# Patient Record
Sex: MALE | Race: AMERICAN INDIAN OR ALASKA NATIVE | URBAN - METROPOLITAN AREA
[De-identification: names, ages, dates, MRNs, and addresses within clinical notes are randomized per-mention and may not be internally consistent; named-entity substitution may affect disease eponyms.]

---

## 2024-03-07 ENCOUNTER — OFFICE VISIT (OUTPATIENT)
Dept: FAMILY MEDICINE CLINIC | Facility: CLINIC | Age: 34
End: 2024-03-07
Payer: COMMERCIAL

## 2024-03-07 VITALS
BODY MASS INDEX: 23.49 KG/M2 | WEIGHT: 155 LBS | DIASTOLIC BLOOD PRESSURE: 87 MMHG | HEIGHT: 68 IN | TEMPERATURE: 98 F | RESPIRATION RATE: 16 BRPM | SYSTOLIC BLOOD PRESSURE: 121 MMHG | OXYGEN SATURATION: 98 % | HEART RATE: 79 BPM

## 2024-03-07 DIAGNOSIS — B34.9 VIRAL ILLNESS: Primary | ICD-10-CM

## 2024-03-07 PROCEDURE — 3074F SYST BP LT 130 MM HG: CPT | Performed by: NURSE PRACTITIONER

## 2024-03-07 PROCEDURE — 99203 OFFICE O/P NEW LOW 30 MIN: CPT | Performed by: NURSE PRACTITIONER

## 2024-03-07 PROCEDURE — 3079F DIAST BP 80-89 MM HG: CPT | Performed by: NURSE PRACTITIONER

## 2024-03-07 PROCEDURE — 3008F BODY MASS INDEX DOCD: CPT | Performed by: NURSE PRACTITIONER

## 2024-03-07 NOTE — PROGRESS NOTES
CHIEF COMPLAINT:     Chief Complaint   Patient presents with    Covid     5 days, HA, redness in throat, congestion, cough, fatigue, denies fever, exposure to covid, otc loratadine, flonase, throat spray, tylenol        HPI:   Franci Ohara is a 33 year old male who presents with symptoms as described below for 5 days.       Fever:     Yes []     No [x]       Cough:    Yes [x]     No []       Dry []     Productive [x]   Congestion: Yes [x]     No []      Rhinorrhea: Yes []     No [x]      Loss of Smell/Taste: Yes []     No [x]        Fatigue:   Yes [x]     No []     Myalgias: Yes []     No [x]   Chills:       Yes []    No [x]       Headache:     Yes [x]     No []       Sore throat:   Yes []      No [x]      Ear Pain:  Yes []      No [x]        Shortness of breath:  Yes []   No [x]     Wheezing:   Yes []   No [x]     Chest pain/pressure:     Yes []     No [x]        GI symptoms: Yes []     No [x]                     Nausea  []; Vomiting  []; Diarrhea  [] ; Upset stomach [];   Abdominal Pain  []          Pt reports additional symptoms red spot in throat, unsure if red spot is a new finding.       Symptoms have been the same since onset.  Treating symptoms with flonase, claritin, tylenol. .      PT  reports covid exposure , Pt  denies exposure to strep or mono.   Previous history of covid: reports   History of previous covid testing: reports   Any recent travel: denies     Other factors/medical conditions that may impact condition: none      No current outpatient medications on file.      History reviewed. No pertinent past medical history.   History reviewed. No pertinent surgical history.      Social History     Socioeconomic History    Marital status:    Tobacco Use    Smoking status: Never    Smokeless tobacco: Never         REVIEW OF SYSTEMS:   GENERAL: decreased appetite, drinking fluids yes.  SKIN: Denies rash or other lesions  HEENT: See HPI  LUNGS: See HPI  CARDIOVASCULAR: denies palpitations; see  HPI  GI: see HPI  NEURO: Denies dizziness; see HPI    EXAM:   /87   Pulse 79   Temp 97.5 °F (36.4 °C)   Resp 16   Ht 5' 8\" (1.727 m)   Wt 155 lb (70.3 kg)   SpO2 98%   BMI 23.57 kg/m²   GENERAL: appears well, well nourished, in no apparent distress  SKIN: no rashes,no suspicious lesions  HEAD: atraumatic, normocephalic.  no tenderness on palpation of  sinuses  EYES: conjunctiva clear  EARS: TM's cloudy, no bulging, no retraction,scant fluid, bony landmarks obscured  NOSE: Nostrils patent, clear nasal discharge, nasal mucosa erythematous.   THROAT: Oral mucosa pink, moist. Posterior pharynx is  erythematous on left pillar only, no exudates. Uvula midline   NECK: Supple, non-tender  LUNGS: clear to auscultation bilaterally, no wheezes or rhonchi. Breathing is non labored. Speaking in full sentences without hesitation  CARDIO: RRR without murmur  EXTREMITIES: no cyanosis, clubbing or edema  LYMPH:  no lymphadenopathy.    NEURO: No focal deficits       ASSESSMENT AND PLAN:   Franci Ohara is a 33 year old male who presents with upper respiratory symptoms that are consistent with likely Covid-19, declined send out testing, advised home testing if interested. Pt is day 5 of symptoms and day 8 of exposure.     ASSESSMENT:   Encounter Diagnosis   Name Primary?    Viral illness Yes       Meds & Refills for this Visit:  Requested Prescriptions      No prescriptions requested or ordered in this encounter       PLAN:    COVID-19 testing declined.   Discussed length of time to obtain results as well as obtaining results on mychart.   Encouraged mychart sign up if not already completed.   CDC quarantine recommendations reviewed. Advised a negative COVID test does not guarantee pt is not infectious or contagious.   Advised to follow up with PCP, notify them of pending test first, for reevaluation for persistent symptoms.    Discussed s/s of worsening infection/condition with Patient and importance of prompt medical  re-evaluation including when to seek emergency care. Patient voiced understanding  Comfort care as described in Patient Instructions. Increase fluids and rest.   May consider OTC tylenol or ibuprofen as needed and directed on packaging .  May consider OTC dextromethorphan as needed and directed on packaging as a cough suppressant   May consider OTC saline nasal spray per box instructions  All questions were addressed and answered.   Patient voiced understanding.   There are no Patient Instructions on file for this visit.  The patient/parent indicates understanding of these issues and agrees to the plan.

## 2024-03-13 ENCOUNTER — WALK IN (OUTPATIENT)
Dept: URGENT CARE | Age: 34
End: 2024-03-13

## 2024-03-13 VITALS
TEMPERATURE: 98.2 F | HEART RATE: 79 BPM | SYSTOLIC BLOOD PRESSURE: 126 MMHG | OXYGEN SATURATION: 98 % | RESPIRATION RATE: 18 BRPM | DIASTOLIC BLOOD PRESSURE: 80 MMHG

## 2024-03-13 DIAGNOSIS — J02.9 ST (SORE THROAT): Primary | ICD-10-CM

## 2024-03-13 DIAGNOSIS — R05.1 ACUTE COUGH: ICD-10-CM

## 2024-03-13 DIAGNOSIS — J06.9 UPPER RESPIRATORY TRACT INFECTION, UNSPECIFIED TYPE: ICD-10-CM

## 2024-03-13 DIAGNOSIS — J02.9 VIRAL PHARYNGITIS: ICD-10-CM

## 2024-03-13 LAB
INTERNAL PROCEDURAL CONTROLS ACCEPTABLE: YES
S PYO AG THROAT QL IA.RAPID: NEGATIVE
TEST LOT EXPIRATION DATE: NORMAL
TEST LOT NUMBER: NORMAL

## 2024-03-13 RX ORDER — PREDNISONE 20 MG/1
40 TABLET ORAL DAILY
Qty: 6 TABLET | Refills: 0 | Status: SHIPPED | OUTPATIENT
Start: 2024-03-13 | End: 2024-03-16

## 2024-07-08 ENCOUNTER — LAB REQUISITION (OUTPATIENT)
Dept: LAB | Facility: HOSPITAL | Age: 34
End: 2024-07-08
Payer: COMMERCIAL

## 2024-07-08 DIAGNOSIS — Z00.00 ENCOUNTER FOR GENERAL ADULT MEDICAL EXAMINATION WITHOUT ABNORMAL FINDINGS: ICD-10-CM

## 2024-07-08 PROCEDURE — 88305 TISSUE EXAM BY PATHOLOGIST: CPT | Performed by: DENTIST

## 2024-09-09 ENCOUNTER — OFFICE VISIT (OUTPATIENT)
Dept: FAMILY MEDICINE CLINIC | Facility: CLINIC | Age: 34
End: 2024-09-09
Payer: COMMERCIAL

## 2024-09-09 VITALS
HEART RATE: 82 BPM | SYSTOLIC BLOOD PRESSURE: 120 MMHG | OXYGEN SATURATION: 98 % | DIASTOLIC BLOOD PRESSURE: 72 MMHG | BODY MASS INDEX: 24.86 KG/M2 | WEIGHT: 164 LBS | RESPIRATION RATE: 16 BRPM | HEIGHT: 68 IN

## 2024-09-09 DIAGNOSIS — Z13.29 SCREENING FOR THYROID DISORDER: ICD-10-CM

## 2024-09-09 DIAGNOSIS — Z00.00 WELLNESS EXAMINATION: Primary | ICD-10-CM

## 2024-09-09 DIAGNOSIS — Z13.220 LIPID SCREENING: ICD-10-CM

## 2024-09-09 DIAGNOSIS — Z80.41 FAMILY HISTORY OF OVARIAN CANCER: ICD-10-CM

## 2024-09-09 DIAGNOSIS — Z12.5 SCREENING FOR PROSTATE CANCER: ICD-10-CM

## 2024-09-09 DIAGNOSIS — Z13.0 SCREENING FOR DEFICIENCY ANEMIA: ICD-10-CM

## 2024-09-09 RX ORDER — MINOXIDIL 2.5 MG/1
TABLET ORAL
COMMUNITY
Start: 2024-08-12

## 2024-09-09 RX ORDER — FINASTERIDE 1 MG/1
TABLET, FILM COATED ORAL
COMMUNITY
Start: 2022-09-09

## 2024-09-09 NOTE — PROGRESS NOTES
HPI:   Franci Oahra is a 34 year old male who presents for an Annual Health Visit.     Hre to establish care, hasn't had a pcp since moving to [place].    Mom was recently diagnosed with cancer, and wants to make sure preventive care is in place.    Not fully complete.    Social:  Moved Last April,   with one kid, and second one on th way  2 year old., lives nearby.  .   Was living in [place].  Has family in the area, wife's aunt lives nearby.          Allergies:   No Known Allergies    CURRENT MEDICATIONS   Current Outpatient Medications   Medication Sig Dispense Refill    finasteride 1 MG Oral Tab       minoxidil 2.5 MG Oral Tab         HISTORICAL INFORMATION   Past Medical History:    Hyperlipidemia    Borderline    Hypothyroidism    No longer have hashimotos      History reviewed. No pertinent surgical history.   Family History   Problem Relation Age of Onset    Cancer Mother         Diagnosed with colorectal cancer last month    Hypertension Father       SOCIAL HISTORY   Social History     Socioeconomic History    Marital status:    Tobacco Use    Smoking status: Never    Smokeless tobacco: Never   Substance and Sexual Activity    Alcohol use: Yes     Alcohol/week: 3.0 standard drinks of alcohol     Types: 3 Cans of beer per week    Drug use: Yes     Frequency: 1.0 times per week     Types: Cannabis     Comment: Less than once per week   Other Topics Concern    Caffeine Concern No    Exercise Yes    Seat Belt Yes    Special Diet No    Stress Concern Yes    Weight Concern No     Social History     Social History Narrative    Not on file        REVIEW OF SYSTEMS:     Constitutional: negative  Eyes: negative  ENT: negative  Respiratory: negative  Cardiovascular: negative  Gastrointestinal: negative  Integument/Breast: negative  Genitourinary: negative  Heme/Lymph: negative  Musculoskeletal: negative  Neurological: negative  Psych: negative  Endocrine: negative  Allergic/Immune:  negative    EXAM:   /72   Pulse 82   Resp 16   Ht 5' 8\" (1.727 m)   Wt 164 lb (74.4 kg)   SpO2 98%   BMI 24.94 kg/m²    Wt Readings from Last 6 Encounters:   09/09/24 164 lb (74.4 kg)   03/07/24 155 lb (70.3 kg)     Body mass index is 24.94 kg/m².    General: alert, appears stated age, and cooperative  Head: Normocephalic, without obvious abnormality, atraumatic  Eyes: conjunctivae/corneas clear. PERRL, EOM's intact. Fundi benign.  Ears: normal TM's and external ear canals both ears  Nose: Nares normal. Septum midline. Mucosa normal. No drainage or sinus tenderness.  Throat: lips, mucosa, and tongue normal; teeth and gums normal  Neck: no adenopathy, no carotid bruit, no JVD, supple, symmetrical, trachea midline, and thyroid not enlarged, symmetric, no tenderness/mass/nodules  Heart: S1, S2 normal, no murmur, click, rub or gallop, regular rate and rhythm  Lungs: clear to auscultation bilaterally  Chest wall: no tenderness  Abdomen: soft, non-tender; bowel sounds normal; no masses,  no organomegaly  : deferred  Back: symmetric, no curvature. ROM normal. No CVA tenderness.  Extremities: extremities normal, atraumatic, no cyanosis or edema  Pulses: 2+ and symmetric  Skin: Skin color, texture, turgor normal. No rashes or lesions  Lymph Nodes: Cervical, supraclavicular, and axillary nodes normal.  Neurologic: Grossly normal    ASSESSMENT AND PLAN:   Franci was seen today for physical.    Diagnoses and all orders for this visit:    Wellness examination  -     Comp Metabolic Panel (14); Future  -     CBC With Differential With Platelet; Future  -     Lipid Panel; Future    Lipid screening  -     Lipid Panel; Future    Screening for deficiency anemia  -     CBC With Differential With Platelet; Future    Screening for prostate cancer  -     PSA, Total (Screening) [E]; Future    Family history of ovarian cancer  -     Genetic Referral - In Network    Screening for thyroid disorder  -     TSH W Reflex To Free T4  [E]; Future    Overall doing well, has concerns for preventive health particularly in light of his mother's ovarian cancer diagnosis  There are no Patient Instructions on file for this visit.    The patient indicates understanding of these issues and agrees to the plan.    Problem List:  There is no problem list on file for this patient.      Kam Rivas MD  9/9/2024  4:29 PM

## 2024-10-09 ENCOUNTER — LAB ENCOUNTER (OUTPATIENT)
Dept: LAB | Age: 34
End: 2024-10-09
Attending: FAMILY MEDICINE
Payer: COMMERCIAL

## 2024-10-09 DIAGNOSIS — Z13.0 SCREENING FOR DEFICIENCY ANEMIA: ICD-10-CM

## 2024-10-09 DIAGNOSIS — Z12.5 SCREENING FOR PROSTATE CANCER: ICD-10-CM

## 2024-10-09 DIAGNOSIS — Z00.00 WELLNESS EXAMINATION: ICD-10-CM

## 2024-10-09 DIAGNOSIS — Z13.220 LIPID SCREENING: ICD-10-CM

## 2024-10-09 DIAGNOSIS — Z13.29 SCREENING FOR THYROID DISORDER: ICD-10-CM

## 2024-10-09 LAB
ALBUMIN SERPL-MCNC: 4.8 G/DL (ref 3.2–4.8)
ALBUMIN/GLOB SERPL: 1.7 {RATIO} (ref 1–2)
ALP LIVER SERPL-CCNC: 56 U/L
ALT SERPL-CCNC: 74 U/L
ANION GAP SERPL CALC-SCNC: 9 MMOL/L (ref 0–18)
AST SERPL-CCNC: 42 U/L (ref ?–34)
BASOPHILS # BLD AUTO: 0.03 X10(3) UL (ref 0–0.2)
BASOPHILS NFR BLD AUTO: 0.6 %
BILIRUB SERPL-MCNC: 0.8 MG/DL (ref 0.3–1.2)
BUN BLD-MCNC: 22 MG/DL (ref 9–23)
CALCIUM BLD-MCNC: 9.6 MG/DL (ref 8.7–10.4)
CHLORIDE SERPL-SCNC: 105 MMOL/L (ref 98–112)
CHOLEST SERPL-MCNC: 201 MG/DL (ref ?–200)
CO2 SERPL-SCNC: 26 MMOL/L (ref 21–32)
COMPLEXED PSA SERPL-MCNC: 0.39 NG/ML (ref ?–4)
CREAT BLD-MCNC: 1.17 MG/DL
EGFRCR SERPLBLD CKD-EPI 2021: 84 ML/MIN/1.73M2 (ref 60–?)
EOSINOPHIL # BLD AUTO: 0.09 X10(3) UL (ref 0–0.7)
EOSINOPHIL NFR BLD AUTO: 1.8 %
ERYTHROCYTE [DISTWIDTH] IN BLOOD BY AUTOMATED COUNT: 12.5 %
FASTING PATIENT LIPID ANSWER: YES
FASTING STATUS PATIENT QL REPORTED: YES
GLOBULIN PLAS-MCNC: 2.9 G/DL (ref 2–3.5)
GLUCOSE BLD-MCNC: 90 MG/DL (ref 70–99)
HCT VFR BLD AUTO: 44.3 %
HDLC SERPL-MCNC: 50 MG/DL (ref 40–59)
HGB BLD-MCNC: 14.9 G/DL
IMM GRANULOCYTES # BLD AUTO: 0.01 X10(3) UL (ref 0–1)
IMM GRANULOCYTES NFR BLD: 0.2 %
LDLC SERPL CALC-MCNC: 125 MG/DL (ref ?–100)
LYMPHOCYTES # BLD AUTO: 1.71 X10(3) UL (ref 1–4)
LYMPHOCYTES NFR BLD AUTO: 34.4 %
MCH RBC QN AUTO: 28.7 PG (ref 26–34)
MCHC RBC AUTO-ENTMCNC: 33.6 G/DL (ref 31–37)
MCV RBC AUTO: 85.2 FL
MONOCYTES # BLD AUTO: 0.32 X10(3) UL (ref 0.1–1)
MONOCYTES NFR BLD AUTO: 6.4 %
NEUTROPHILS # BLD AUTO: 2.81 X10 (3) UL (ref 1.5–7.7)
NEUTROPHILS # BLD AUTO: 2.81 X10(3) UL (ref 1.5–7.7)
NEUTROPHILS NFR BLD AUTO: 56.6 %
NONHDLC SERPL-MCNC: 151 MG/DL (ref ?–130)
OSMOLALITY SERPL CALC.SUM OF ELEC: 293 MOSM/KG (ref 275–295)
PLATELET # BLD AUTO: 161 10(3)UL (ref 150–450)
POTASSIUM SERPL-SCNC: 4.3 MMOL/L (ref 3.5–5.1)
PROT SERPL-MCNC: 7.7 G/DL (ref 5.7–8.2)
RBC # BLD AUTO: 5.2 X10(6)UL
SODIUM SERPL-SCNC: 140 MMOL/L (ref 136–145)
TRIGL SERPL-MCNC: 147 MG/DL (ref 30–149)
TSI SER-ACNC: 1.88 MIU/ML (ref 0.55–4.78)
VLDLC SERPL CALC-MCNC: 26 MG/DL (ref 0–30)
WBC # BLD AUTO: 5 X10(3) UL (ref 4–11)

## 2024-10-09 PROCEDURE — 36415 COLL VENOUS BLD VENIPUNCTURE: CPT

## 2024-10-09 PROCEDURE — 80053 COMPREHEN METABOLIC PANEL: CPT

## 2024-10-09 PROCEDURE — 80061 LIPID PANEL: CPT

## 2024-10-09 PROCEDURE — 85025 COMPLETE CBC W/AUTO DIFF WBC: CPT

## 2024-10-09 PROCEDURE — 84443 ASSAY THYROID STIM HORMONE: CPT

## 2024-11-11 ENCOUNTER — NURSE ONLY (OUTPATIENT)
Dept: HEMATOLOGY/ONCOLOGY | Facility: HOSPITAL | Age: 34
End: 2024-11-11
Attending: FAMILY MEDICINE
Payer: COMMERCIAL

## 2024-11-11 ENCOUNTER — GENETICS ENCOUNTER (OUTPATIENT)
Dept: GENETICS | Facility: HOSPITAL | Age: 34
End: 2024-11-11
Attending: FAMILY MEDICINE
Payer: COMMERCIAL

## 2024-11-11 DIAGNOSIS — Z84.81 FAMILY HISTORY OF GENETIC DISEASE CARRIER: ICD-10-CM

## 2024-11-11 DIAGNOSIS — Z80.41 FAMILY HISTORY OF OVARIAN CANCER: Primary | ICD-10-CM

## 2024-11-11 PROCEDURE — 96040 HC GENETIC COUNSELING EA 30 MIN: CPT | Performed by: GENETIC COUNSELOR, MS

## 2024-11-11 PROCEDURE — 36415 COLL VENOUS BLD VENIPUNCTURE: CPT

## 2024-11-11 NOTE — PROGRESS NOTES
Patient Name: Franci Ohara  YOB: 1990  Date of Visit: 2024    Reason for visit: Mr. Ohara was seen for the purposes of genetic counseling due to a family history of ovarian cancer in his mother with a possible germline BRCA1 c.4485-1G>A (splice site) pathogenic variant found in [facility] circulating cfDNA testing done on his mother. His wife is also reportedly a carrier of a single MUTYH c.312C>A pathogenic variant (per wife). The purpose of this visit was to review information regarding genetic testing options for mutations in high penetrance cancer susceptibility genes including BRCA1 and MUTYH genes.    Referring Provider: Kam Rivas MD    Medical History: Mr. Ohara is a pleasant and generally healthy 34 year old male presenting with no personal history of cancer. He had recent PSA screen done in 10/2024 which was normal. He has never had a colonoscopy.       Relevant Family History: Mr. Ohara has 2 sons (2y, 1 week).     He has 2 sisters who are identical twins (37y).     His mother (61y) was diagnosed with advanced stage ovarian cancer ~4 months ago at age 60y. Because of her ovarian cancer dx she had [facility] liquid CDx testing, which is a circulation cfDNA NGS test, that identified a BRCA1 c.4485-1G>A pathogenic variant (VAF 62.1%) suspicious for a germline variant, confirmatory testing for the BRCA1 variant via dedicated germline panel is advised, this has not been done yet in his mom to Mr. Ohara's knowledge.    There is 1 maternal aunt and 1 maternal uncle. The maternal grandfather  >50y dt a MI. The maternal grandmother is living.     Mr. Ohara's father (69y) has no cancer hx.     There are 3 paternal aunts and 2 paternal uncles. The paternal grandfather is  and the paternal grandmother is living.     Mr. Ohara reports his wife is a carrier of a single MUTYH gene mutation, he called her on the phone to confirm this and she stated the mutation was MUTYH  c.312C>A.     The rest of the family history is negative for other significant genetic conditions, cancers, or birth defects of any kind. See scanned pedigree for full family history reported during the session.    Mr. Ohara's maternal and paternal ethnicity is . He is not of any Ashkenazi [employer] heritage.    Summary: Because Mr. Ohara's mother was found to have a suspected germline pathogenic BRCA1 variant. Should the BRCA1 variant indeed be germline, Mr. Ohara has an estimated 50% chance to have inherited the same BRCA1 variant as his mother, genetic testing is indicated. As his mother has not had a confirmatory dedicated germline hereditary cancer panel, genetic testing for the BRCA1 gene and other hereditary cancer genes is indicated for Mr. Ohara given his mother's ovarian cancer dx and finding of a suspected germline BRCA1 pathogenic variant on circulating cfDNA NGS testing. I reviewed information about germline BRCA1 gene mutations with him and gave him printed information to review.     Additionally, Mr. Ohara reports his wife is a carrier of a single MUTYH gene mutation, he called her on the phone to confirm this and she stated the mutation was MUTYH c.312C>A. Carrier testing for autosomal recessive MUTYH-associated polyposis (MAP) via the MUTYH gene is recommended for Mr. Ohara to determine if the couple is at an increased risk of having a child with autosomal recessive MAP. The couple already have 2 young sons and are thinking about having a daughter.     If testing is performed, three results are possible: positive, negative, and variant of uncertain significance.  A positive result indicates a mutation has been identified, and there is an increased risk for the cancers associated with the specific gene.  Since mutations in most cancer susceptibility genes are inherited in an autosomal dominant fashion, siblings and children of individuals with a mutation have a 50% risk of carrying the  mutation as well.      A negative test result would indicate that no mutation was identified in a cancer susceptibility gene.  While testing detects gene mutations, it is possible for a mutation to be present and go undetected.  It is also possible for a mutation to be located in a gene other than those being tested.     A variant of uncertain significance means that a change has been identified a cancer susceptibility gene; however, it is uncertain if the variant is pathogenic or a non-deleterious change.  With time, the variant may be reclassified as either positive or negative.    Since mutation identification is necessary, an affected family member is preferred in order to confirm that a mutation is indeed present in a particular family.  If the mutation can be identified in an affected individual, this information can be used to screen other at-risk individuals in the family.  Individuals who are positive for the same mutation would be expected to have a much greater risk for developing hereditary cancer during their lifetime than the general population and surveillance and management would be rigorous.  For those individuals who are found to not carry the mutation, risks for developing cancer during their lifetime would return to those expected for individuals in the general population.  It should be emphasized that absence of a mutation in an at-risk individual would not eliminate their risk for cancer, but simply return them to the risk expected for the general population. If no affected individual is available for testing, a negative result, while reassuring, cannot be completely informative of the familial cancer risk as it is unknown whether no mutation was found because the individual tested is truly negative for the familial mutation or whether the familial mutation was unable to be detected by the genetic testing method used. In such cases, screening and follow-up should be guided by personal and  family history.     It should be noted that no one under age 18 can have testing performed for mutations in high-penetrance cancer predisposition genes for adult-onset conditions. Mr. Gould genetic information is protected under the Genetic Information Nondiscrimination Act of 2008 (MARK). MARK is a federal law protecting individuals from genetic discrimination in health insurance and most places of employment. MARK protections against genetic discrimination do not apply to other forms of insurance such as life, long term care, disability, and  insurance. Individuals without such policies in place may wish to consider doing so before proceeding with genetic testing, since their genetic information could potentially be used to inform decisions concerning eligibility, premiums, and coverage.    Because Mr. Ohara's mother was diagnosed with ovarian cancer and found to have a suspected germline BRCA1 c.4485-1G>A (splice site) pathogenic variant on [facility] liquid CDx circulating cfDNA NGS testing and Mr. Ohara's wife is reportedly a carrier of a single MUTYH pathogenic variant, genetic testing for mutations in high-penetrance cancer susceptibility genes, including BRCA1 and MUTYH, is indicated based on the NCCN guidelines (BOP V.1.2025; Hereditary colorectal cancer V.3.2024).      Mr. Ohara appeared to understand the information presented. On the day of the visit Mr. Ohara elected to proceed with genetic testing for hereditary cancer syndromes, including BRCA1 and MUTYH genes. My office will call Mr. Ohara as soon as results are received; post-test counseling can be scheduled at that time. Thank you for allowing me to participate in the care of your patient; please do not hesitate to contact my office if you have any questions or concerns, [phone].    Plan:   Blood was drawn and sent out for [facility] common hereditary cancers panel (48 genes, including BRCA1 and MUTYH genes; TAT: 2-3 weeks).     The Genetics office will call Mr. Ohara when results are available.  Recommendations for Mr. Ohara and family members will depend the above genetic testing results.      Send to: Rob  Time spent with patient: 40 minutes      Vickie Hatfield MS, [facility]

## 2024-11-20 ENCOUNTER — GENETICS ENCOUNTER (OUTPATIENT)
Dept: HEMATOLOGY/ONCOLOGY | Facility: HOSPITAL | Age: 34
End: 2024-11-20

## 2024-11-20 DIAGNOSIS — Z13.71 BRCA GENE MUTATION NEGATIVE IN MALE: Primary | ICD-10-CM

## 2024-11-20 NOTE — PROGRESS NOTES
Patient Name: Franci Ohara  YOB: 1990    Referring Provider:  Kam Rivas MD      Reason for Referral:  Mr. Ohara had genetic testing performed on 11/11/2024 because of a family history of ovarian cancer in his mother with a possible germline BRCA1 c.4485-1G>A (splice site) pathogenic variant found in [facility] [place] circulating cfDNA testing done on his mother. His wife is also reportedly a carrier of a single MUTYH c.312C>A pathogenic variant (per wife).     Genetic Testing Result:  Negative. No pathogenic variant was found in the following 48 genes including the BRCA1 and MUTYH genes: APC, SYLWIA, AXIN2, BAP1, BARD1, BMPR1A, BRCA1, BRCA2, BRIP1, CDH1, CDK4, CDKN2A, CHEK2, CTNNA1, DICER1, EPCAM, FH, GREM1, HOXB13, KIT, MBD4, MEN1, MLH1, MSH2, MSH3, MSH6, MUTYH, NF1, NTHL1, PALB2, PDGFRA, PMS2, POLD1, POLE, PTEN, RAD51C, RAD51D, SDHA, SDHB, SDHC, SDHD, SMAD4, SMARCA4, STK11, TP53, TSC1, TSC2, VHL. Please refer to the report from [facility] for additional testing information. These results were discussed in a [employer] message left at Mr. Ohara's phone number: [phone] on 11/20/2024, and a copy of the test results was mailed to Mr. Ohara's permanent address.    Summary and Plan:   These results indicate that Mr. Ohara was not found to have a pathogenic variant (harmful genetic mutation) in any of the genes listed above. No pathogenic variants associated with hereditary cancer syndromes were identified. The limitations of the testing were discussed with Mr. Ohara including the chance that a pathogenic variant in a gene other than those included in this analysis might be the cause of cancer in Mr. Ohara or in relatives.     Mr. Ohara did not inherit the BRCA1 c.4485-1G>A (splice site) pathogenic variant found on his mother's prior genetic testing. His children would not be at risk to inherit this BRCA1 gene variant as he did not inherit the mutation and therefore cannot pass it on. He should  follow cancer screening recommendations for the general population.     He was also not found to have any harmful mutations in the MUTYH gene. Mr. Ohara and his wife would not be expected to have an increased risk to have a child with autosomal recessive MUTYH-associated polyposis (MAP).     I encouraged Mr. Ohara to share the genetic test results with his relatives so that they may discuss the implications of this information with their health providers. Mr. Ohara is also encouraged to contact me on an annual basis to learn if there have been any updates in genetic information that would apply or if there are changes in the personal and/or family history. Please do not hesitate to contact my office if you have any questions or concerns, [phone].     Vickie Hatfield MS, CGC

## 2025-03-06 ENCOUNTER — OFFICE VISIT (OUTPATIENT)
Dept: FAMILY MEDICINE CLINIC | Facility: CLINIC | Age: 35
End: 2025-03-06
Payer: COMMERCIAL

## 2025-03-06 ENCOUNTER — LAB ENCOUNTER (OUTPATIENT)
Dept: LAB | Age: 35
End: 2025-03-06
Attending: INTERNAL MEDICINE
Payer: COMMERCIAL

## 2025-03-06 VITALS
RESPIRATION RATE: 16 BRPM | DIASTOLIC BLOOD PRESSURE: 60 MMHG | WEIGHT: 158 LBS | HEIGHT: 68 IN | HEART RATE: 87 BPM | OXYGEN SATURATION: 97 % | SYSTOLIC BLOOD PRESSURE: 120 MMHG | BODY MASS INDEX: 23.95 KG/M2

## 2025-03-06 DIAGNOSIS — R53.82 CHRONIC FATIGUE: Primary | ICD-10-CM

## 2025-03-06 DIAGNOSIS — R53.82 CHRONIC FATIGUE: ICD-10-CM

## 2025-03-06 LAB
DEPRECATED HBV CORE AB SER IA-ACNC: 137 NG/ML
IRON SERPL-MCNC: 104 UG/DL
VIT B12 SERPL-MCNC: 629 PG/ML (ref 211–911)
VIT D+METAB SERPL-MCNC: 32.4 NG/ML (ref 30–100)

## 2025-03-06 PROCEDURE — 3078F DIAST BP <80 MM HG: CPT | Performed by: INTERNAL MEDICINE

## 2025-03-06 PROCEDURE — 3008F BODY MASS INDEX DOCD: CPT | Performed by: INTERNAL MEDICINE

## 2025-03-06 PROCEDURE — 82607 VITAMIN B-12: CPT | Performed by: INTERNAL MEDICINE

## 2025-03-06 PROCEDURE — 84402 ASSAY OF FREE TESTOSTERONE: CPT | Performed by: INTERNAL MEDICINE

## 2025-03-06 PROCEDURE — 82728 ASSAY OF FERRITIN: CPT | Performed by: INTERNAL MEDICINE

## 2025-03-06 PROCEDURE — 83540 ASSAY OF IRON: CPT | Performed by: INTERNAL MEDICINE

## 2025-03-06 PROCEDURE — 99214 OFFICE O/P EST MOD 30 MIN: CPT | Performed by: INTERNAL MEDICINE

## 2025-03-06 PROCEDURE — 3074F SYST BP LT 130 MM HG: CPT | Performed by: INTERNAL MEDICINE

## 2025-03-06 PROCEDURE — 84403 ASSAY OF TOTAL TESTOSTERONE: CPT | Performed by: INTERNAL MEDICINE

## 2025-03-06 PROCEDURE — 82306 VITAMIN D 25 HYDROXY: CPT | Performed by: INTERNAL MEDICINE

## 2025-03-06 RX ORDER — TRETINOIN 0.25 MG/G
CREAM TOPICAL
COMMUNITY
Start: 2025-02-25

## 2025-03-06 NOTE — PROGRESS NOTES
The 21st Century Cures Act makes medical notes like these available to patients in the interest of transparency. Please be advised this is a medical document. Medical documents are intended to carry relevant information, facts as evident, and the clinical opinion of the practitioner. The medical note is intended as peer to peer communication and may appear blunt or direct. It is written in medical language and may contain abbreviations or verbiage that are unfamiliar.       Franci Ohara is a 34 year old male.  HPI:   5-6 years of fatigue.  Affecting work. Fatigue and brain fog.  Used to not be a coffee drinker, now drinking 3 cups a day and still tired.  Affects working out. Wants to be consistent but the day after working out is worse.  Has 2 children but fatigue occurred before he had children.    Sleep average: 7 hours, wakes twice a night.  Diet is well balanced.   Patient states no problems with concentration or focus in high school or college but admits to falling asleep in classes unless it was a class that he really enjoyed.  No tobacco.  No shortness of breath.  No chest pain or pressure with minimal exertion.  Denies symptoms of sleep apnea.  Denies history of tick bite.  Denies restlessness during sleep but wife says that he twitches a lot.       Past Medical History:    BRCA gene mutation negative in male    Negative 48 gene hereditary cancer panel, report in media tab    Hyperlipidemia    Borderline    Hypothyroidism    No longer have hashimotos      Social History:  Social History     Socioeconomic History    Marital status:    Tobacco Use    Smoking status: Never    Smokeless tobacco: Never   Substance and Sexual Activity    Alcohol use: Yes     Alcohol/week: 3.0 standard drinks of alcohol     Types: 3 Cans of beer per week    Drug use: Yes     Frequency: 1.0 times per week     Types: Cannabis     Comment: Less than once per week   Other Topics Concern    Caffeine Concern No    Exercise Yes     Seat Belt Yes    Special Diet No    Stress Concern Yes    Weight Concern No        REVIEW OF SYSTEMS:   GENERAL HEALTH: feels well otherwise  SKIN: denies any unusual skin lesions or rashes  RESPIRATORY: denies shortness of breath  CARDIOVASCULAR: denies chest pain or pressure  GI: denies N/V/D; denies abdominal pain; denies loss of appetite or weight loss  NEURO: denies headaches, denies dizziness; denies numbness or tingling  Psych: Denies depression or anxiety but admits to bouts of low mood or stress due to the situation of having small children and an infant      EXAM:   /60   Pulse 87   Resp 16   Ht 5' 8\" (1.727 m)   Wt 158 lb (71.7 kg)   SpO2 97%   BMI 24.02 kg/m²   GENERAL: well developed, well nourished, in no apparent distress  SKIN: warm & dry  HEENT: atraumatic, normocephalic, TMs clear, throat clear  NECK: supple,no adenopathy, no thyromegaly  LUNGS: CTA, easy breathing  CV: normal S1S2, RRR without murmur  GI: good BS's,no masses, HSM or tenderness  EXT: no edema    ASSESSMENT AND PLAN:   1. Chronic fatigue  -Rule out deficiencies  -Discussed possible sleep study for hypersomnia, rule out mild narcolepsy  - Iron, Serum [E]; Future  - Ferritin [E]; Future  - Vitamin B12 [E]; Future  - Vitamin D [E]; Future  - Testosterone, Total Free, Male [E]; Future        The patient indicates understanding of these issues and agrees to the plan.  Follow up after lab work.

## 2025-03-13 LAB
FREE TESTOST DIRECT: 5.8 PG/ML
TESTOSTERONE: 290 NG/DL

## 2025-03-16 DIAGNOSIS — E29.1 HYPOTESTOSTERONEMIA IN MALE: ICD-10-CM

## 2025-03-16 DIAGNOSIS — R53.82 CHRONIC FATIGUE: Primary | ICD-10-CM
